# Patient Record
Sex: FEMALE | Race: WHITE | NOT HISPANIC OR LATINO | URBAN - METROPOLITAN AREA
[De-identification: names, ages, dates, MRNs, and addresses within clinical notes are randomized per-mention and may not be internally consistent; named-entity substitution may affect disease eponyms.]

---

## 2023-04-07 ENCOUNTER — EMERGENCY (EMERGENCY)
Facility: HOSPITAL | Age: 53
LOS: 1 days | Discharge: HOME | End: 2023-04-07
Attending: EMERGENCY MEDICINE | Admitting: EMERGENCY MEDICINE
Payer: SELF-PAY

## 2023-04-07 VITALS
HEART RATE: 84 BPM | DIASTOLIC BLOOD PRESSURE: 74 MMHG | RESPIRATION RATE: 19 BRPM | TEMPERATURE: 99 F | WEIGHT: 116.84 LBS | OXYGEN SATURATION: 97 % | HEIGHT: 66.14 IN | SYSTOLIC BLOOD PRESSURE: 113 MMHG

## 2023-04-07 VITALS — SYSTOLIC BLOOD PRESSURE: 124 MMHG | DIASTOLIC BLOOD PRESSURE: 76 MMHG

## 2023-04-07 DIAGNOSIS — Z87.19 PERSONAL HISTORY OF OTHER DISEASES OF THE DIGESTIVE SYSTEM: ICD-10-CM

## 2023-04-07 DIAGNOSIS — R10.9 UNSPECIFIED ABDOMINAL PAIN: ICD-10-CM

## 2023-04-07 DIAGNOSIS — U07.1 COVID-19: ICD-10-CM

## 2023-04-07 LAB
APPEARANCE UR: CLEAR — SIGNIFICANT CHANGE UP
BILIRUB UR-MCNC: NEGATIVE — SIGNIFICANT CHANGE UP
COLOR SPEC: YELLOW — SIGNIFICANT CHANGE UP
DIFF PNL FLD: NEGATIVE — SIGNIFICANT CHANGE UP
FLUAV AG NPH QL: SIGNIFICANT CHANGE UP
FLUBV AG NPH QL: SIGNIFICANT CHANGE UP
GLUCOSE UR QL: NEGATIVE — SIGNIFICANT CHANGE UP
KETONES UR-MCNC: ABNORMAL MG/DL
LEUKOCYTE ESTERASE UR-ACNC: NEGATIVE — SIGNIFICANT CHANGE UP
NITRITE UR-MCNC: NEGATIVE — SIGNIFICANT CHANGE UP
PH UR: 5.5 — SIGNIFICANT CHANGE UP (ref 5–8)
PROT UR-MCNC: NEGATIVE MG/DL — SIGNIFICANT CHANGE UP
RSV RNA NPH QL NAA+NON-PROBE: SIGNIFICANT CHANGE UP
SARS-COV-2 RNA SPEC QL NAA+PROBE: DETECTED
SP GR SPEC: 1.02 — SIGNIFICANT CHANGE UP (ref 1–1.03)
UROBILINOGEN FLD QL: 0.2 E.U./DL — SIGNIFICANT CHANGE UP

## 2023-04-07 PROCEDURE — 99284 EMERGENCY DEPT VISIT MOD MDM: CPT

## 2023-04-07 RX ORDER — NIRMATRELVIR AND RITONAVIR 150-100 MG
1 KIT ORAL
Qty: 1 | Refills: 0
Start: 2023-04-07 | End: 2023-05-27

## 2023-04-07 RX ORDER — ACETAMINOPHEN 500 MG
975 TABLET ORAL ONCE
Refills: 0 | Status: COMPLETED | OUTPATIENT
Start: 2023-04-07 | End: 2023-04-07

## 2023-04-07 RX ADMIN — Medication 975 MILLIGRAM(S): at 22:29

## 2023-04-07 NOTE — ED PROVIDER NOTE - PATIENT PORTAL LINK FT
You can access the FollowMyHealth Patient Portal offered by [facility] by registering at the following website: [email or web address]. By joining [employer]’s FollowMyHealth portal, you will also be able to view your health information using other applications (apps) compatible with our system.

## 2023-04-07 NOTE — ED PROVIDER NOTE - OBJECTIVE STATEMENT
She states she woke up this morning with bilateral flank pain and a sore throat accompanied by chills and subjectively feeling feverish.  She denies any abdominal pain, nausea, or vomiting.  She denies any chest pain, shortness of breath, or productive cough.  She does have a mild headache.  She denies any neck stiffness.  She complains of generalized myalgias.    She recently traveled here from [facility], and is visiting for a short time.  She has no history of DVT or PE.  She has been vaccinated for COVID but never received a flu shot.  She also is concerned she may have a kidney infection as she had one a long time ago that may have felt the same way.  She denies any recent dysuria, hematuria, or increased urinary frequency.  She does suffer from chronic colitis but denies any recent change in her bowel movements.  No diarrhea, hematochezia, or melena.  Her daughter is here with her and is healthy.

## 2023-04-07 NOTE — ED ADULT TRIAGE NOTE - CHIEF COMPLAINT QUOTE
Pt walk in complaining of lower back pain, HA and chills since this morning. Pt concerned for pyelo. Denies any urinary sx.

## 2023-04-07 NOTE — ED PROVIDER NOTE - GASTROINTESTINAL, MLM
Abdomen soft, non-tender, non-distended, no rebound or guarding.  Bilateral moderate CVA tenderness.  No hepatosplenomegaly.

## 2023-04-07 NOTE — ED PROVIDER NOTE - CLINICAL SUMMARY MEDICAL DECISION MAKING FREE TEXT BOX
Pt with flu-like symptoms today after flying here from [place] recently.  UA unremarkable.  Flu/RSV negative.  COVID POSITIVE.  Will prescribe Paxlovid, as patient reports hx of autoimmune issues & colitis and may be at higher risk of worsening infection.

## 2023-04-07 NOTE — ED ADULT NURSE NOTE - CHPI ED NUR SYMPTOMS POS
CHIEF COMPLAINT:  Chief Complaint   Patient presents with   • Skin Assessment     FBSE   • Lesion     left upper abdomen       HISTORY OF PRESENT ILLNESS:  The patient is a(n) established 59 year old female.  Former patient of Dr. Kimbrough, last appointment 01/31/2018        The patient presents for a Full Body Skin Exam, personal history of extensive sun exposure and family history of NMSC. Patient had a heart transplant 02/01/2016    Specific areas of concern to the patient include:   Left upper abdomen    º Location:  The lesion is located on the following part of the body:  Left upper abdomen  º Quality:     The lesion itches  º Severity:    The severity of the symptoms are mild  º Duration:  The lesion has been present for the following length of time:   1 week   How has the lesion changed during that time: none  º Modifying Factors:    What makes it better: nothing   What makes it worse:  nothing  Previous treatments include: none  Details on treatments and if they worked or why they were stopped: n/a  º Associated signs and symptoms: none    REVIEW OF SYSTEMS:  Constitutional:  The patient generally feels well:  Yes  Integument:   The patient denies any other signs or symptoms of skin disease:  Yes  Cardiovascular:   The patient has a pacemaker:  No       The patient has a defibrillator:  No     The patient has artificial heart valves: No  Hematologic:  The patient bleeds easily because of being on aspirin or an anticoagulant:  Yes, Details: ASA   Musculoskeletal: The patient has artificial joints: No    The patient is pregnant: No  The patient is breastfeeding: No    DERMATOLOGY PAST MEDICAL HISTORY:    The patient has history of skin cancer: No       PAST MEDICAL HISTORY:  The patient has a history of immunosuppression (transplant, HIV, leukemia, lymphoma, immunosuppressant medications): Yes, Details: Heart transplant 2016, Graves disease   The patient has a history of chemotherapy and/or radiation:  No    FAMILY HISTORY:    The patient has a family history of  non melanoma skin cancer    SOCIAL HISTORY:  Occupation: store owner  History of significant outdoor exposure: Yes, Details: skiing in the past   History of sunburns:No  Smoking or vaping: No  ETOH:No  History of using a tanning bed: No  Use of sunscreen: Yes, Details: SPF 30     ALLERGIES:   Allergen Reactions   • Seasonal Other (See Comments)   • Msg [Monosodium Glutamate   (Food Or Med)] HEADACHES     Current Outpatient Medications   Medication Sig Dispense Refill   • sirolimus (RAPAMUNE) 1 MG tablet Take 2 mg by mouth Mondays, Wednesdays, and Fridays. Take 1 mg by mouth all other days of the week. 120 tablet 3   • tacrolimus (PROGRAF) 1 MG capsule Take 2 capsules by mouth 2 times daily. 360 capsule 3   • ondansetron (ZOFRAN ODT) 4 MG disintegrating tablet Place 1 tablet onto the tongue every 12 hours as needed for nausea. 20 tablet 0   • acetaminophen (TYLENOL) 500 MG tablet Take 1 tablet by mouth every 6 hours as needed for Pain. 30 tablet 0   • aspirin 81 MG EC tablet Take 1 tablet by mouth daily. 30 tablet 11   • Simethicone (GAS-X PO) Take 1 tablet by mouth as needed.     • Dextromethorphan-Guaifenesin (MUCINEX DM PO) Take 1 tablet by mouth as needed.     • pravastatin (PRAVACHOL) 40 MG tablet Take 1 tablet by mouth daily. 90 tablet 3   • magnesium lactate (MAG-TAB SR) 84 MG (7MEQ) Tab CR Take 2 tablets by mouth daily (at noon). Must be taken separately from mycophenolate 60 tablet 11   • cholecalciferol (VITAMIN D3) 1000 UNITS tablet Take 2,000 Units by mouth daily (at noon). Take 2 tablets (=2000 units)     • docusate sodium-sennosides (SENOKOT S) 50-8.6 MG per tablet Take 2 tablets by mouth daily as needed for Constipation. 60 tablet 0   • calcium carbonate-vitamin D (CALTRATE+D) 600-400 MG-UNIT per tablet Take 1 tablet by mouth 2 times daily. Take at NOON and with evening meal.  Separate from mycophenolate.     • Multiple Vitamins-Minerals  (ALIVE WOMENS ENERGY) Tab Take 1 tablet by mouth daily (at noon). Take at NOON, separate from mycophenolate.     • Calcium Carbonate Antacid (TUMS PO) Take 1 tablet by mouth 2 times daily. Uses as needed       No current facility-administered medications for this visit.      Past Medical History:   Diagnosis Date   • Arthritis     knees hips and shoulders   • Brain bleed (CMS/[facility]) 11/13/2015   • Cerebral infarction (CMS/[facility]) 11/3/2015    bleed/weakness L side   • CMV (cytomegalovirus infection) (CMS/[facility]) 11/2016   • Congestive cardiac failure (CMS/[facility])     pre heart transplant   • Diaphragm paralysis    • Fracture     knee/left   • Gastroesophageal reflux disease    • Granulomatous disease (CMS/[facility]) 4/28/2015    On lung nodule biopsy in 2/2015, started on pred for presumed sarcoid   • Graves disease    • H/O seasonal allergies    • Hearing loss in left ear 01/2017    sudden hearing loss- saw Dr. Triana (ENT) who put patient on prednisone for 2 weeks   • Hernia, incisional     lower third of sternotomy scar   • Hyperlipidemia 5/10/2014    pre heart transplant   • LVAD (left ventricular assist device) present (CMS/[facility]) 10/19/2015    removed pt has heart transplant   • Mobility impaired     walker for distance   • Nonischemic cardiomyopathy (CMS/[facility]) 2015    on heart transplant list   • PONV (postoperative nausea and vomiting)    • S/P orthotopic heart transplant (CMS/[facility]) 2/1/2016   • Sarcoidosis    • Sinusitis, chronic     seasonal allergies in fall   • SOB (shortness of breath)     hospitalized last 8/26/15 for SOB    • Syncope and collapse 11/27/2015   • Thyroid storm        PHYSICAL EXAM:  Vitals:    05/30/19 1357   Pulse: 88   Weight: 68.8 kg   Height: 5' 8\" (1.727 m)     [x] Vital signs x 3 (see above)  [x] Patient well developed and well groomed  [x] Oriented to time, place and person  [x] Appropriate mood and affect    [x] Inspection of conjunctivae/eyelids  [x] Examination of oropharynx  [] Examination of  peripheral vascular system (edema)  [x] Examination of anus  [x] Palpation of lymph nodes  [x] Inspection and palpation of digits and nails    Inspection of:  [x] Scalp and hair  [x] Head and face  [x] Neck  [x] Chest  [x] Abdomen  [x] Back  [x] Right upper extremity  [x] Left upper extremity  [x] Buttocks, no genitalia [] Buttocks and genitalia [] Groin  [x] Right lower extremity  [x] Left lower extremity  [] Inspection of eccrine and apocrine glands     Hyperpigmented stuck on papules on the right temple, back, chest and left upper abdomen      Hyperpigmented, sharply demarcated macules located on the legs  Numerous tan to brown macules located on the back and shoulders   Well circumscribed regular brown macules on the legs and back        ASSESSMENT AND PLAN:      1. Lentigines  -  Reassurance of the benign nature of these lesions  -  Counseled that these lesions occur in response to excessive sun exposure over time  -  Sun-protective behavior discussed including the use of sunscreen SPF 30+ daily and reapplying at least every 2 hours.  Also discussed sun protective clothing including long sleeves and wide brimmed hats when anticipating sun exposure.  Avoid peak sun hours and seek shade from 10 a.m. to 4 p.m. when possible  - Counseled regarding avoidance of tanning beds  - Patient does not have a history of tanning bed use  - Patient does not have a history of sunburns  - Patient has a history of immunosuppression, chemotherapy or radiation  -  RTC (return to clinic) if any lesions have any new or concerning changes    2. Seborrheic Keratoses  -  Reassurance of the benign nature of these lesions and that there is no need to treat unless they become symptomatic    3. Benign Nevi  -  Reassurance of benign nature of this condition  -  Discussed ABCDEs  -  Recommend monthly self skin exams to monitor for any concerning changes    4. Pulmonary and Heart Sarcoidosis  -management per #5    5. History of  Immunosuppression  - Patient has a history of immunosuppression, Heart transplant 2016 ([facility] at [facility])  and taking Sirolimus and tacrolimus. History of sarcoidosis of the lungs  - Patient does not have a history of tanning bed use  - Patient does not have a history of sunburns  - FBSE (full body skin examination) done 5/30/2019 next due 1 year  -  Sun-protective behavior discussed including the use of sunscreen SPF 30+ daily and reapplying at least every 2 hours.  Also discussed sun protective clothing including long sleeves and wide brimmed hats when anticipating sun exposure.  Avoid peak sun hours and seek shade from 10 a.m.-4 p.m. when possible  - Counseled regarding avoidance of tanning beds  -  Recommend monthly self skin exams to monitor for any concerning changes  -  RTC (return to clinic) sooner if any lesions have any new or concerning changes      6. Family history of skin cancer  - non melanotic skin cancer  - Management per # 5        RTC (return to clinic) 1 year for FBSE  On 5/30/2019, ICatherine CMA, scribed the services personally performed by Priscilla Clark MD.    The documentation recorded by the scribe accurately and completely reflects the service(s) I personally performed and the decisions made by me.          BACK PAIN

## 2023-04-07 NOTE — ED PROVIDER NOTE - NSFOLLOWUPINSTRUCTIONS_ED_ALL_ED_FT
Your COVID 19 test result was POSITIVE.  You are being prescribed PAXLOVID - take as directed.    Please continue to self quarantine (home isolation) for a total of 5 days since day of testing and no more fever with feeling back to baseline.  After 5 days, you will be able to stop home isolation but still practice standard precautions, similar to how you would manage a regular flu/cold.    Return to ER for any worsening symptoms, such as persistent fever >100.4F, shortness of breath, coughing up bloody sputum, chest pain, lethargy, and fainting    Please remember to wash your hands frequently (>20 seconds each time), avoid touching your face, and cover your cough/sneeze.  Always wear a mask when you are outside of your home and practice social distancing.    Only take Tylenol for fever/pain control and avoid NSAIDs (ibuprofen/Advil/Aleve/naproxen) due to potential increased risk of exacerbating COVID-19 infection